# Patient Record
Sex: FEMALE | Race: WHITE | NOT HISPANIC OR LATINO | Employment: FULL TIME | URBAN - METROPOLITAN AREA
[De-identification: names, ages, dates, MRNs, and addresses within clinical notes are randomized per-mention and may not be internally consistent; named-entity substitution may affect disease eponyms.]

---

## 2024-08-28 ENCOUNTER — HOSPITAL ENCOUNTER (EMERGENCY)
Facility: CLINIC | Age: 49
Discharge: HOME | End: 2024-08-28
Attending: EMERGENCY MEDICINE | Admitting: EMERGENCY MEDICINE
Payer: OTHER MISCELLANEOUS

## 2024-08-28 VITALS
SYSTOLIC BLOOD PRESSURE: 154 MMHG | HEIGHT: 68 IN | HEART RATE: 80 BPM | TEMPERATURE: 98.6 F | OXYGEN SATURATION: 100 % | BODY MASS INDEX: 25.76 KG/M2 | WEIGHT: 170 LBS | RESPIRATION RATE: 18 BRPM | DIASTOLIC BLOOD PRESSURE: 119 MMHG

## 2024-08-28 DIAGNOSIS — S01.83XA PUNCTURE WOUND OF FACE, INITIAL ENCOUNTER: ICD-10-CM
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
## 2024-08-28 PROCEDURE — 250N000013 HC RX MED GY IP 250 OP 250 PS 637: Performed by: EMERGENCY MEDICINE

## 2024-08-28 PROCEDURE — 99283 EMERGENCY DEPT VISIT LOW MDM: CPT | Mod: 25

## 2024-08-28 PROCEDURE — 90715 TDAP VACCINE 7 YRS/> IM: CPT | Performed by: EMERGENCY MEDICINE

## 2024-08-28 PROCEDURE — 250N000011 HC RX IP 250 OP 636: Performed by: EMERGENCY MEDICINE

## 2024-08-28 PROCEDURE — 90471 IMMUNIZATION ADMIN: CPT | Performed by: EMERGENCY MEDICINE

## 2024-08-28 RX ADMIN — AMOXICILLIN AND CLAVULANATE POTASSIUM 1 TABLET: 875; 125 TABLET, FILM COATED ORAL at 20:45

## 2024-08-28 RX ADMIN — CLOSTRIDIUM TETANI TOXOID ANTIGEN (FORMALDEHYDE INACTIVATED), CORYNEBACTERIUM DIPHTHERIAE TOXOID ANTIGEN (FORMALDEHYDE INACTIVATED), BORDETELLA PERTUSSIS TOXOID ANTIGEN (GLUTARALDEHYDE INACTIVATED), BORDETELLA PERTUSSIS FILAMENTOUS HEMAGGLUTININ ANTIGEN (FORMALDEHYDE INACTIVATED), BORDETELLA PERTUSSIS PERTACTIN ANTIGEN, AND BORDETELLA PERTUSSIS FIMBRIAE 2/3 ANTIGEN 0.5 ML: 5; 2; 2.5; 5; 3; 5 INJECTION, SUSPENSION INTRAMUSCULAR at 20:45

## 2024-08-28 ASSESSMENT — COLUMBIA-SUICIDE SEVERITY RATING SCALE - C-SSRS
6. HAVE YOU EVER DONE ANYTHING, STARTED TO DO ANYTHING, OR PREPARED TO DO ANYTHING TO END YOUR LIFE?: NO
1. IN THE PAST MONTH, HAVE YOU WISHED YOU WERE DEAD OR WISHED YOU COULD GO TO SLEEP AND NOT WAKE UP?: NO
2. HAVE YOU ACTUALLY HAD ANY THOUGHTS OF KILLING YOURSELF IN THE PAST MONTH?: NO

## 2024-08-28 ASSESSMENT — ACTIVITIES OF DAILY LIVING (ADL)
ADLS_ACUITY_SCORE: 35
ADLS_ACUITY_SCORE: 33

## 2024-08-29 NOTE — DISCHARGE INSTRUCTIONS
Keep wounds clean and dry apply Neosporin.  Return with any concern for infection take 5 days of Augmentin for infection prophylaxis we have updated your tetanus.  Since the dog was up-to-date on rabies we do not need to worry about rabies vaccination.  Thanks for your patience today.

## 2024-08-29 NOTE — ED PROVIDER NOTES
"  Emergency Department Note      History of Present Illness     Chief Complaint   Dog Bite      HPI   Reema Miranda is a 49 year old female who presents to the ED for an evaluation of a dog bite. The patient reports that she was trying to hold a dog, when she got bitten in her left side of cheek area. She states that the dog is vaccinated. Notes that she used betadine to clean her wounds. Patient last Tdap is 03/05/2012.      Independent Historian   Patient, as per HPI.       Review of External Notes     Past Medical History     Medical History and Problem List   Low grade squamous intraepithelial lesion on cytologic smear of cevix   Interstitial cystitis   Nontoxic multinodular goiter       Medications   Gabapentin   Desyrel   Tenormin   Flexeril     Surgical History   Cholecystectomy   Lipoma resection   [place] teeth extraction    Physical Exam     Patient Vitals for the past 24 hrs:   BP Temp Temp src Pulse Resp SpO2 Height Weight   08/28/24 1933 (!) 154/119 98.6  F (37  C) Temporal 80 18 100 % 1.727 m (5' 8\") 77.1 kg (170 lb)     Physical Exam  Vitals reviewed.   HENT:      Head: Normocephalic.      Comments: 3 puncture wounds in the left cheek over the posterior and mid aspect of the face.  Not through and through.  No foreign body.  No erythema.  Skin:     Capillary Refill: Capillary refill takes less than 2 seconds.   Neurological:      General: No focal deficit present.      Mental Status: She is alert and oriented to person, place, and time.   Psychiatric:         Mood and Affect: Mood normal.           Diagnostics     Lab Results   Labs Ordered and Resulted from Time of ED Arrival to Time of ED Departure - No data to display    Imaging   No orders to display       Independent Interpretation   None    ED Course      Medications Administered   Medications   Tdap (tetanus-diphtheria-acell pertussis) (ADACEL) injection 0.5 mL (has no administration in time range)   amoxicillin-clavulanate (AUGMENTIN) 875-125 " MG per tablet 1 tablet (has no administration in time range)       Procedures   Procedures     Discussion of Management   None    ED Course   ED Course as of 08/28/24 2039   Wed Aug 28, 2024   2013 I have obtained history and evaluated the patient.          Additional Documentation  None    Medical Decision Making / Diagnosis     CMS Diagnoses:   and None    MIPS       None    MDM   Reema Miranda is a 49 year old female presents with a dog bite to the face.  Examination shows puncture wounds to the face without significant infection.  Already cleansed by her but recleansed by S.  Tetanus was made up-to-date.  Prophylactic antibiotics requested by the patient and offered in a short course.  Doubt infection due to it being facial injury and well vascularity of the head neck.  Patient is offered reassurance and discharged home    Disposition   The patient was discharged.     Diagnosis     ICD-10-CM    1. Puncture wound of face, initial encounter  S01.83XA       2. Dog bite, initial encounter  W54.0XXA            Discharge Medications   New Prescriptions    AMOXICILLIN-CLAVULANATE (AUGMENTIN) 875-125 MG TABLET    Take 1 tablet by mouth 2 times daily for 5 days.         Scribe Disclosure:  I, Zoila Pritchett, am serving as a scribe at 8:18 PM on 8/28/2024 to document services personally performed by Randy Gallagher MD based on my observations and the provider's statements to me.        Randy Gallagher MD  08/30/24 0037

## 2024-08-29 NOTE — ED TRIAGE NOTES
Doing blood draw on a dog, but was bitten to her left side of face. Three bite puncture wound noted to left side of cheek area, bleeding controlled. Per patient, dog is up to date on shots. ABCs intact.      Triage Assessment (Adult)       Row Name 08/28/24 1932          Triage Assessment    Airway WDL WDL        Respiratory WDL    Respiratory WDL WDL        Peripheral/Neurovascular WDL    Peripheral Neurovascular WDL WDL